# Patient Record
(demographics unavailable — no encounter records)

---

## 2024-10-30 NOTE — HISTORY OF PRESENT ILLNESS
[de-identified] : vaccine needed [FreeTextEntry6] : 12 year old female here for vaccine  HPI:   Feeling well today with no fever, respiratory or GI concerns No previous reaction to vaccines VIS and consent sent previously  Mom called and made aware

## 2024-10-30 NOTE — DISCUSSION/SUMMARY
[FreeTextEntry1] : Vaccine needed  Plan  HPV Vaccine administered. Vaccine education done.  Updated CIR copy given Monitored X10 minutes and returned to class. Anticipatory guidance given [] : The components of the vaccine(s) to be administered today are listed in the plan of care. The disease(s) for which the vaccine(s) are intended to prevent and the risks have been discussed with the caretaker.  The risks are also included in the appropriate vaccination information statements which have been provided to the patient's caregiver.  The caregiver has given consent to vaccinate.

## 2025-01-08 NOTE — PHYSICAL EXAM
[Alert] : alert [No Acute Distress] : no acute distress [Normocephalic] : normocephalic [EOMI Bilateral] : EOMI bilateral [Clear tympanic membranes with bony landmarks and light reflex present bilaterally] : clear tympanic membranes with bony landmarks and light reflex present bilaterally  [Pink Nasal Mucosa] : pink nasal mucosa [Nonerythematous Oropharynx] : nonerythematous oropharynx [Supple, full passive range of motion] : supple, full passive range of motion [No Palpable Masses] : no palpable masses [Clear to Auscultation Bilaterally] : clear to auscultation bilaterally [Regular Rate and Rhythm] : regular rate and rhythm [Normal S1, S2 audible] : normal S1, S2 audible [No Murmurs] : no murmurs [+2 Femoral Pulses] : +2 femoral pulses [Soft] : soft [NonTender] : non tender [Non Distended] : non distended [Normoactive Bowel Sounds] : normoactive bowel sounds [No Hepatomegaly] : no hepatomegaly [No Splenomegaly] : no splenomegaly [Tarik: ____] : Tarik [unfilled] [Tarik: _____] : Tarik [unfilled] [No Abnormal Lymph Nodes Palpated] : no abnormal lymph nodes palpated [Normal Muscle Tone] : normal muscle tone [No Gait Asymmetry] : no gait asymmetry [No pain or deformities with palpation of bone, muscles, joints] : no pain or deformities with palpation of bone, muscles, joints [+2 Patella DTR] : +2 patella DTR [Cranial Nerves Grossly Intact] : cranial nerves grossly intact [No Rash or Lesions] : no rash or lesions [de-identified] : Mild 5  degree curve in spine

## 2025-01-08 NOTE — HISTORY OF PRESENT ILLNESS
[Yes] : Patient goes to dentist yearly [Up to date] : Up to date [Normal] : normal [LMP: _____] : LMP: [unfilled] [Days of Bleeding: _____] : Days of bleeding: [unfilled] [Cycle Length: _____ days] : Cycle Length: [unfilled] days [Age of Menarche: ____] : Age of Menarche: [unfilled] [Irregular menses] : no irregular menses [Painful Cramps] : painful cramps [Grade: ____] : Grade: [unfilled] [Normal Performance] : normal performance [Normal Behavior/Attention] : normal behavior/attention [Normal Homework] : normal homework [Eats regular meals including adequate fruits and vegetables] : eats regular meals including adequate fruits and vegetables [Drinks non-sweetened liquids] : drinks non-sweetened liquids  [Calcium source] : calcium source [Has concerns about body or appearance] : does not have concerns about body or appearance [Has friends] : has friends [At least 1 hour of physical activity a day] : at least 1 hour of physical activity a day [Screen time (except homework) less than 2 hours a day] : no screen time (except homework) less than 2 hours a day [Uses electronic nicotine delivery system] : does not use electronic nicotine delivery system [Exposure to electronic nicotine delivery system] : no exposure to electronic nicotine delivery system [Uses tobacco] : does not use tobacco [Exposure to tobacco] : no exposure to tobacco [Uses drugs] : does not use drugs  [Exposure to drugs] : no exposure to drugs [Drinks alcohol] : does not drink alcohol [Exposure to alcohol] : no exposure to alcohol [No] : No cigarette smoke exposure [Uses safety belts/safety equipment] : uses safety belts/safety equipment  [de-identified] : Lives with mom and siblings 3 sisters and 1 brother [de-identified] : [employer]; very good student [FreeTextEntry1] : 13 year old here for well child exam  HPI: She is feeling well today with no fever, respiratory or GI concerns  PMH: no significant PMH FH: Mom is well  Home : lives with Mom, 4 brothers (in their 20's ) 10 year old sister, 16 yrs 26 yrs No smokers at home. Dad passes away Ed: [employer] 7th grade; good student Activity: likes to draw Has friends No substance use ; no vaping No feelings of sadness or anxiety Dental: has had a dental appointment recently

## 2025-01-08 NOTE — RISK ASSESSMENT
[Little interest or pleasure doing things] : 1) Little interest or pleasure doing things [Feeling down, depressed, or hopeless] : 2) Feeling down, depressed, or hopeless [0] : 2) Feeling down, depressed, or hopeless: Not at all (0) [PHQ-2 Negative - No further assessment needed] : PHQ-2 Negative - No further assessment needed [[id number]] : 0

## 2025-01-08 NOTE — DISCUSSION/SUMMARY
[Normal Growth] : growth [Normal Development] : development  [No Elimination Concerns] : elimination [Continue Regimen] : feeding [No Skin Concerns] : skin [Normal Sleep Pattern] : sleep [None] : no medical problems [Anticipatory Guidance Given] : Anticipatory guidance addressed as per the history of present illness section [Physical Growth and Development] : physical growth and development [Social and Academic Competence] : social and academic competence [Emotional Well-Being] : emotional well-being [Risk Reduction] : risk reduction [Violence and Injury Prevention] : violence and injury prevention [No Vaccines] : no vaccines needed [No Medications] : ~He/She~ is not on any medications [Patient] : patient [Parent/Guardian] : Parent/Guardian [FreeTextEntry1] : Well  adolescent.  - 5 degree curve in spine; Mild scoliosis Plan  -Counseled regarding dental hygiene, pubertal changes, seatbelt safety, and internet safety. Healthy eating habits, exercise and keeping recreational screen time to less than 2 hours a day discussed. - Infection prevention discussed - Bright [employer] Parent handout sent home    Routine dental care           Visit summary sent home

## 2025-04-03 NOTE — HISTORY OF PRESENT ILLNESS
[de-identified] : vaccine needed [FreeTextEntry6] : 13 year old here for vaccine  HPI:   Feeling well today with no fever, respiratory or GI concerns No previous reaction to vaccines VIS and consent sent previously